# Patient Record
Sex: MALE | Race: WHITE | NOT HISPANIC OR LATINO | URBAN - METROPOLITAN AREA
[De-identification: names, ages, dates, MRNs, and addresses within clinical notes are randomized per-mention and may not be internally consistent; named-entity substitution may affect disease eponyms.]

---

## 2024-01-31 ENCOUNTER — OFFICE VISIT (OUTPATIENT)
Dept: URBAN - METROPOLITAN AREA CLINIC 118 | Facility: CLINIC | Age: 39
End: 2024-01-31
Payer: OTHER GOVERNMENT

## 2024-01-31 VITALS
HEART RATE: 63 BPM | SYSTOLIC BLOOD PRESSURE: 124 MMHG | TEMPERATURE: 97.9 F | WEIGHT: 176.6 LBS | HEIGHT: 70 IN | DIASTOLIC BLOOD PRESSURE: 84 MMHG | BODY MASS INDEX: 25.28 KG/M2

## 2024-01-31 DIAGNOSIS — K64.1 GRADE II INTERNAL HEMORRHOIDS: ICD-10-CM

## 2024-01-31 DIAGNOSIS — K59.09 OTHER CONSTIPATION: ICD-10-CM

## 2024-01-31 DIAGNOSIS — K62.89 ANAL PAIN: ICD-10-CM

## 2024-01-31 DIAGNOSIS — K92.1 BLOOD IN FECES: ICD-10-CM

## 2024-01-31 PROCEDURE — 46221 LIGATION OF HEMORRHOID(S): CPT | Performed by: INTERNAL MEDICINE

## 2024-01-31 PROCEDURE — 99204 OFFICE O/P NEW MOD 45 MIN: CPT | Performed by: INTERNAL MEDICINE

## 2024-01-31 RX ORDER — ATORVASTATIN CALCIUM 40 MG/1
TAKE 1 TABLET BY MOUTH EVERY DAY TABLET ORAL
Qty: 90 EACH | Refills: 2 | Status: ACTIVE | COMMUNITY

## 2024-01-31 RX ORDER — METOPROLOL TARTRATE 25 MG/1
TABLET, FILM COATED ORAL
Qty: 180 TABLET | Status: ACTIVE | COMMUNITY

## 2024-01-31 NOTE — EXAM-PHYSICAL EXAM
Vicky in room as chaperone Rectal exam: small non thrombosed ext posterior hemorrhoid mildly enlarged prostate  Anoscopy performed using self lighted anoscope Grade 2 RA, Grade 2 IRRITATED RP, Grade 2 LL  Banded RP, RA, and LL using [facility] Disrupt CKan system without complication after reviewing R/B/A with the patient  WAS HAVING MILD CRAMPING SO LOOSENED THE BANDS AND SYMPTOMS RESOLVED

## 2024-01-31 NOTE — HPI-TODAY'S VISIT:
patient reports change in stool shape for a year or two now with blood intermittantly on the end of the stool and the TP anal pain as well the stool is about bristol stool number 4 reports enlarged prostate bm's daily no abd pain no weight loss no FH CRC or CA  works as a  for [employer] drinks lots of water, not great about healthy diet though

## 2024-02-27 ENCOUNTER — HEM (OUTPATIENT)
Dept: URBAN - METROPOLITAN AREA CLINIC 118 | Facility: CLINIC | Age: 39
End: 2024-02-27
Payer: OTHER GOVERNMENT

## 2024-02-27 VITALS
BODY MASS INDEX: 25.48 KG/M2 | DIASTOLIC BLOOD PRESSURE: 86 MMHG | HEART RATE: 69 BPM | WEIGHT: 178 LBS | TEMPERATURE: 97.9 F | HEIGHT: 70 IN | SYSTOLIC BLOOD PRESSURE: 141 MMHG

## 2024-02-27 DIAGNOSIS — K59.09 OTHER CONSTIPATION: ICD-10-CM

## 2024-02-27 DIAGNOSIS — K62.89 ANAL PAIN: ICD-10-CM

## 2024-02-27 DIAGNOSIS — K92.1 BLOOD IN FECES: ICD-10-CM

## 2024-02-27 DIAGNOSIS — K64.1 GRADE II INTERNAL HEMORRHOIDS: ICD-10-CM

## 2024-02-27 DIAGNOSIS — K64.4 EXTERNAL HEMORRHOID: ICD-10-CM

## 2024-02-27 PROCEDURE — 99212 OFFICE O/P EST SF 10 MIN: CPT | Performed by: INTERNAL MEDICINE

## 2024-02-27 RX ORDER — METOPROLOL TARTRATE 25 MG/1
TABLET, FILM COATED ORAL
Qty: 180 TABLET | Status: ACTIVE | COMMUNITY

## 2024-02-27 RX ORDER — ATORVASTATIN CALCIUM 40 MG/1
TAKE 1 TABLET BY MOUTH EVERY DAY TABLET ORAL
Qty: 90 EACH | Refills: 2 | Status: ACTIVE | COMMUNITY

## 2024-02-27 NOTE — HPI-TODAY'S VISIT:
2/27/24 here for repeat hemorrhoid banding, was having bleeding with BM that I felt most likey hemorrhoid/fissure related, banded 1 month ago, here to f/u in terms of symptoms  Bleeding: rare small amount on TP so much less than a month ago  no more anal pain either shape is still different (a little flat on one side) but stable for 2 years   1/31/24 patient reports change in stool shape for a year or two now with blood intermittantly on the end of the stool and the TP anal pain as well the stool is about bristol stool number 4 reports enlarged prostate bm's daily no abd pain no weight loss no FH CRC or CA  works as a  for [employer] drinks lots of water, not great about healthy diet though